# Patient Record
Sex: FEMALE | Race: BLACK OR AFRICAN AMERICAN | Employment: OTHER | URBAN - METROPOLITAN AREA
[De-identification: names, ages, dates, MRNs, and addresses within clinical notes are randomized per-mention and may not be internally consistent; named-entity substitution may affect disease eponyms.]

---

## 2017-01-01 ENCOUNTER — CLINICAL SUPPORT (OUTPATIENT)
Dept: CARDIOLOGY CLINIC | Age: 77
End: 2017-01-01

## 2017-01-01 ENCOUNTER — TELEPHONE (OUTPATIENT)
Dept: CARDIOLOGY CLINIC | Age: 77
End: 2017-01-01

## 2017-01-01 ENCOUNTER — DOCUMENTATION ONLY (OUTPATIENT)
Dept: CARDIOLOGY CLINIC | Age: 77
End: 2017-01-01

## 2017-01-01 DIAGNOSIS — I26.99 PULMONARY EMBOLISM, BILATERAL (HCC): ICD-10-CM

## 2017-01-01 DIAGNOSIS — Z79.01 LONG TERM (CURRENT) USE OF ANTICOAGULANTS: Primary | ICD-10-CM

## 2017-01-01 DIAGNOSIS — I48.91 ATRIAL FIBRILLATION, UNSPECIFIED TYPE (HCC): ICD-10-CM

## 2017-01-01 DIAGNOSIS — Z95.0 CARDIAC PACEMAKER IN SITU: Primary | ICD-10-CM

## 2017-01-01 DIAGNOSIS — Z79.01 ANTICOAGULATION MONITORING, INR RANGE 2-3: Primary | ICD-10-CM

## 2017-01-01 DIAGNOSIS — Z79.01 ANTICOAGULATED ON COUMADIN: Primary | ICD-10-CM

## 2017-01-01 LAB
INR BLD: 1.9
INR BLD: 2
INR BLD: 2
INR, EXTERNAL: 1.5
INR, EXTERNAL: 1.9 (ref 2–3)
PT POC: 20.1 SEC
PT POC: 20.1 SEC
PT POC: 21.2 SEC
PT POC: 22 SEC
PT POC: 22.3 SEC
PT, EXTERNAL: 18.2
VALID INTERNAL CONTROL?: YES

## 2017-01-01 RX ORDER — WARFARIN 2.5 MG/1
TABLET ORAL
Qty: 60 TAB | Refills: 5 | Status: SHIPPED | OUTPATIENT
Start: 2017-01-01

## 2017-01-01 RX ORDER — WARFARIN 2.5 MG/1
TABLET ORAL
Qty: 30 TAB | Refills: 5 | Status: SHIPPED | OUTPATIENT
Start: 2017-01-01 | End: 2017-01-01 | Stop reason: SDUPTHER

## 2017-05-12 NOTE — PROGRESS NOTES
Patient in today for coumadin check. Upon standing patient presented with dizziness. Pacemaker check was normal.  Blood pressure 120/50 60bpm blood glucose level 187 O2 sat 98%  Recommended patient follow up with pcp regarding dizziness. Patient's daughter stated patient has occasional dizzy spells. Patient was given water and a package of crackers. Left office stable with daughter.

## 2017-07-14 NOTE — TELEPHONE ENCOUNTER
Leo Valenzuela from [facility] called regarding patient PT/INR order she stated it needs to say specific times of testing. Please fax to [phone].  Thank you

## 2017-11-14 RX ORDER — WARFARIN 2.5 MG/1
TABLET ORAL
Qty: 60 TAB | Refills: 0 | OUTPATIENT
Start: 2017-11-14